# Patient Record
Sex: MALE | Race: WHITE
[De-identification: names, ages, dates, MRNs, and addresses within clinical notes are randomized per-mention and may not be internally consistent; named-entity substitution may affect disease eponyms.]

---

## 2018-01-06 ENCOUNTER — HOSPITAL ENCOUNTER (OUTPATIENT)
Dept: HOSPITAL 80 - FED | Age: 58
Setting detail: OBSERVATION
LOS: 1 days | Discharge: HOME | End: 2018-01-07
Attending: INTERNAL MEDICINE | Admitting: INTERNAL MEDICINE
Payer: COMMERCIAL

## 2018-01-06 DIAGNOSIS — Z96.652: ICD-10-CM

## 2018-01-06 DIAGNOSIS — E10.9: ICD-10-CM

## 2018-01-06 DIAGNOSIS — Z88.8: ICD-10-CM

## 2018-01-06 DIAGNOSIS — Z72.89: ICD-10-CM

## 2018-01-06 DIAGNOSIS — S01.511A: ICD-10-CM

## 2018-01-06 DIAGNOSIS — S01.81XA: ICD-10-CM
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
## 2018-01-06 DIAGNOSIS — S40.811A: ICD-10-CM

## 2018-01-06 DIAGNOSIS — Q28.2: ICD-10-CM

## 2018-01-06 DIAGNOSIS — F12.90: ICD-10-CM

## 2018-01-06 DIAGNOSIS — R55: Primary | ICD-10-CM

## 2018-01-06 LAB
INR PPP: 1.03 (ref 0.83–1.16)
PLATELET # BLD: 291 10^3/UL (ref 150–400)
PROTHROMBIN TIME: 13.7 SEC (ref 12–15)

## 2018-01-06 PROCEDURE — 12015 RPR F/E/E/N/L/M 7.6-12.5 CM: CPT

## 2018-01-06 PROCEDURE — 0HQ1XZZ REPAIR FACE SKIN, EXTERNAL APPROACH: ICD-10-PCS | Performed by: PHYSICIAN ASSISTANT

## 2018-01-06 PROCEDURE — G0480 DRUG TEST DEF 1-7 CLASSES: HCPCS

## 2018-01-06 PROCEDURE — 93880 EXTRACRANIAL BILAT STUDY: CPT

## 2018-01-06 PROCEDURE — 72125 CT NECK SPINE W/O DYE: CPT

## 2018-01-06 PROCEDURE — 93306 TTE W/DOPPLER COMPLETE: CPT

## 2018-01-06 PROCEDURE — 70450 CT HEAD/BRAIN W/O DYE: CPT

## 2018-01-06 PROCEDURE — A9585 GADOBUTROL INJECTION: HCPCS

## 2018-01-06 PROCEDURE — G0378 HOSPITAL OBSERVATION PER HR: HCPCS

## 2018-01-06 PROCEDURE — 93005 ELECTROCARDIOGRAM TRACING: CPT

## 2018-01-06 PROCEDURE — 0CQ1XZZ REPAIR LOWER LIP, EXTERNAL APPROACH: ICD-10-PCS | Performed by: PHYSICIAN ASSISTANT

## 2018-01-06 PROCEDURE — 70553 MRI BRAIN STEM W/O & W/DYE: CPT

## 2018-01-06 NOTE — ECHO
[email or web address]





[address] [address] 

Main: [phone] 



Fax: 



Transthoracic Echocardiogram 

Name:             KARUNA DE LA CRUZ                             MR#:

[id number]

Study Date:       2018                              Study Time:

01:45 PM

YOB: 1960                              Age:

57 year(s)

Height:           182.9 cm (72 in.)                       Weight:

85.73 kg (189 lb.)

BSA:              2.08 m2                                 Gender:

Male

Examination:      Echo                                    Indication:

Cardiac: syncope

Image Quality:                                            Contrast: 

Requested by:     Shahzad Bonilla                            BP:

120 mmHg/59 mmHg

Heart Rate:                                               Rhythm: 

Indication:       Cardiac: syncope 



Procedure Staff 

Ultrasound Technician:   Noemy Arango 

Reading Physician:       Merritt Gerber 

Requesting Provider: 



Conclusions:          1)Normal LV size and systolic function with a

LVEF of 65% and normal wall motions.

2)Mild left atrial enlargement. 

3)Mild MR without MV prolapse. 

4)Trivial TR noted. Unable to accurately assess PA pressures secondary

to incomplete TR jet

velocity envelope. 

5)Mildly enlarged ascending thoracic aorta (4.0cm). 



Measurements: 

Chambers                    Valvular Assessment AV/MV

Valvular Assessment TV/PV



Normal                                   Normal

Normal

Name         Value     Range              Name         Value Range

Name          Value Range

Ao Brenda (MM): 4.0 cm    (2.2 cm-3.7            AV meanP mmHg ( -

)          TR Vmax:      2.21 mm/s ( - )



cm)                MV E Vmax:   0.74 m/s ( - )        TR PGmax:     20

mmHg ( - )

IVSd (2D):   0.8 cm (0.6 cm-1.1               MV A Vmax:   0.52 m/s (

- )        syst. PAP: 25 mmHg  ( - )



cm)                MV E/A:      1.42 ( - )  

LVDd (2D):   4.9 cm    (4.2 cm-5.9 



cm)   

LVDs (2D):   2.3 cm    (2.1 cm-4 



cm)   

LVPWd (2D):  0.8 cm    (0.6 cm-1 



cm)   

LVEF (MOD4): 65 %      (>=55 %)   

EF Range:    65 % 



Continued Measurements: 

Chambers                    Valvular Assessment AV/MV

Valvular Assessment TV/PV



Name                     Value            Name

Value      Name                    Value

LADs:                  4.1 cm                 MV E' Septal:

0.08  m/s   CVP (est.):            5 mmHg

LADs Lon.2 cm                 MV E/E' Septal:

9.90



Patient: KARUNA DE LA CRUZ                          MRN: [id number]

Study Date: 2018   Page 1 of 2

01:45 PM 









LA Area: 21.8 cm2   MV E/E' Lateral: 5.90  



Findings:             Left Ventricle: 

Normal size left ventricle. No LV hypertrophy. Normal global systolic

LV function. The ejection

fraction is estimated to be 65 %. No regional wall motion abnormality.

Normal diastolic LV function.

Right Ventricle: 

Normal size right ventricle.  

Left Atrium: 

The left atrium is mildly dilated.  

Right Atrium: 

The right atrium is normal in size.  

Mitral Valve: 

The mitral valve is normal in appearance and function. Mild mitral

valve regurgitation is present.

Aortic Valve: 

The aortic valve is normal in appearance and function.  

Tricuspid Valve: 

The tricuspid valve is normal in appearance and function. Trivial

tricuspid valve regurgitation.

Pulmonic Valve: 

The pulmonic valve is normal in appearance and function.  

Aorta: 

Dilatation of the aorta.  

Pericardium: 

No pericardial effusion. 







Electronically signed by Merritt Gerber on 2018 at 03:28 PM 

(No Signature Object) 



Patient: KARUNA DE LA CRUZ                          MRN: [id number]

Study Date: 2018   Page 2 of 2

01:45 PM 







[id number]

## 2018-01-06 NOTE — EDPHY
H & P


Smoking Status: Never smoked


Time Seen by Provider: 01/06/18 06:39


HPI/ROS: 





CHIEF COMPLAINT:  Fall with altered mental status





HISTORY OF PRESENT ILLNESS:  Patient has diabetes, which plate poker last night 

with a guys, had some alcohol and possibly an edible.  Wife heard a thud in the 

bathroom at 4:30 a.m. and found him confused and incontinent on the bathroom 

floor bleeding from the face.


She gave him a tsp of honey and this did not change his mental status 

appreciably and was brought in by EMS.


She did not specifically see any seizure activity.


Now the patient has pain in his lip but is otherwise without complaints, but he 

is confused still.  Not associated with headache or weakness or numbness in 

extremities.  Symptoms moderate to severe.





REVIEW OF SYSTEMS:


Eye: no change in vision or double vision


ENT: no sore throat or jaw malocclusion or dental injury


Cardiac:  No chest pain


Pulmonary: no cough or SOB


Abdomen:  No intestinal symptoms


Musculoskeletal: no back pain or neck pain


Skin:  Facial lacerations


Neuro: no headache


Constitutional: no fever or recent illness


:  No symptoms





A comprehensive 10 point review of systems is otherwise negative aside from 

elements mentioned in the history of present illness.





PAST MEDICAL HISTORY:  Discharge summary dated 8/2/2015 personally reviewed, 

was 1st diagnosed with diabetes then.





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 

Played poker last night, some alcohol, possible marijuana edible but no 

recollection.





General Appearance: Alert and conversant, cooperative.


Eyes: No scleral  icterus.  Extraocular motion intact, pupils equal and reactive


ENT, Mouth: Normal mucous membranes.  No hemotympanum, no facial bony or jaw 

tenderness. No trismus.


Respiratory: Normal respiratory effort, breath sounds equal, lungs are clear to 

auscultation.


Cardiovascular:  Regular rate and rhythm.


Gastrointestinal:  Abdomen is soft and non tender.


Neurological:  Patient is alert, face symmetric, strength and sensation normal 

in extremities.  Speech is fluent but he is still confused.  Does not remember 

what happened and when I first asked him his age, he said "51..."  And was 

unable to complete.  He does know it is 2018, not oriented to place, repetitive 

questioning.


Skin:  Facial laceration to the lower lip and 2 lacerations on the chin


Musculoskeletal:  No extremity or clavicular or thoracic or cervical or lumbar 

spine tenderness.


Psychiatric: Not agitated.





Emergency Department course/MDM:





Differential includes but not limited to syncope, seizure, a concussion, 

intracranial hemorrhage.


EKG, labs to include chemistry and glucose, head and cervical spine CT.  

Cervical spine ordered because of altered mental status, inability to clear 

clinically without imaging.  Head CT performed for head trauma with confusion 

and altered mental status.





730:  Noncontrast CT head personally reviewed with Chi.  


Right side calcification vs. blood, unclear if hemorrhage. Negative cervical 

spine.


735:  Results discussed CT reviewed with patient and family, discussed with Dr. Torres from Neurosurgery will review CT as well.


741:  Likely cavernous malformation per Dr. Torres, thinks unlikely to be blood; 

admit medicine to floor, MRI.  Cervical spine clinically cleared at this time 

by me.


815:  repeat glucose 152.


Facial laceration anesthetized by myself with 0.5% bupivacaine local 

anesthesia.  See Vivi Coreas PA-C for repair details.


 (Ghassan Booth)


Constitutional: 





 Initial Vital Signs











Temperature (C)  36.3 C   01/06/18 06:30


 


Heart Rate  69   01/06/18 06:30


 


Respiratory Rate  16   01/06/18 06:30


 


Blood Pressure  158/75 H  01/06/18 06:30


 


O2 Sat (%)  96   01/06/18 06:30








 











O2 Delivery Mode               Room Air














Allergies/Adverse Reactions: 


 





Corticosteroids (Glucocorticoids) Allergy (Verified 01/06/18 06:31)


 


meloxicam Allergy (Verified 01/06/18 06:31)


 








Home Medications: 














 Medication  Instructions  Recorded


 


Insulin Pump, Patient Own 1 ea [facility] [place]/06/18














Medical Decision Making


Consult/Admit Bed Type: [place] [id number]





- Diagnostics


EKG Interpretation: 





12-lead EKG interpreted by me; official reading is in trace master.  My 

interpretation is sinus rhythm rate 65 with  (Ghassan Booth)


Procedures: 


I was asked by Dr. Ghassan Booth to repair facial lacerations.





Laceration repair #1.





Verbal consent was obtained from the patient.  The 2 cm laceration on the chin 

was anesthetized using 1% lidocaine with epinephrine.  The wound was irrigated 

with saline, draped and explored to its base with a gloved finger.  There were 

no deep structures involved.  The wound was repaired with 6 0 Prolene, 5 

sutures.  The wound repair was simple.  The procedure was performed by myself.





Laceration repair #2.





Verbal consent was obtained from the patient.  The 3 cm laceration on the chin 

was anesthetized using 1% lidocaine with epinephrine.  The wound was irrigated 

with saline, draped and explored to its base with a gloved finger.  There were 

no deep structures involved.  The wound was repaired with 6 0 Prolene, 8 

sutures.  The wound repair was simple.  The procedure was performed by myself.





Laceration repair #3.





Verbal consent was obtained from the patient.  The 3 cm irregular laceration on 

the lower lip laceration crossing vermilion border was anesthetized using 1% 

lidocaine with epinephrine.  The wound was irrigated with saline, draped and 

explored to its base with a gloved finger.  There were no deep structures 

involved.  The wound was repaired with 6 0 Prolene, 10 sutures.  The wound 

repair was complex.  The procedure was performed by myself. (Vivi Coreas)


Differential Diagnosis: 





Differential diagnosis considered for head injury including but not limited to 

concussion, skull fracture, intraparenchymal contusion, subarachnoid, subdural 

and epidural hematoma. (Ghassan Booth)





- Data Points


Laboratory Results: 





 Laboratory Results





 01/06/18 06:20 





 01/06/18 06:20 





 











  01/06/18 01/06/18 01/06/18





  07:45 06:20 06:20


 


WBC      5.34 10^3/uL 10^3/uL





     (3.80-9.50) 


 


RBC      5.05 10^6/uL 10^6/uL





     (4.40-6.38) 


 


Hgb      16.4 g/dL g/dL





     (13.7-17.5) 


 


Hct      45.0 % %





     (40.0-51.0) 


 


MCV      89.1 fL fL





     (81.5-99.8) 


 


MCH      32.5 pg pg





     (27.9-34.1) 


 


MCHC      36.4 g/dL g/dL





     (32.4-36.7) 


 


RDW      12.7 % %





     (11.5-15.2) 


 


Plt Count      291 10^3/uL 10^3/uL





     (150-400) 


 


MPV      9.3 fL fL





     (8.7-11.7) 


 


Neut % (Auto)      58.7 % %





     (39.3-74.2) 


 


Lymph % (Auto)      26.4 % %





     (15.0-45.0) 


 


Mono % (Auto)      11.2 % %





     (4.5-13.0) 


 


Eos % (Auto)      2.6 % %





     (0.6-7.6) 


 


Baso % (Auto)      0.7 % %





     (0.3-1.7) 


 


Nucleat RBC Rel Count      0.0 % %





     (0.0-0.2) 


 


Absolute Neuts (auto)      3.13 10^3/uL 10^3/uL





     (1.70-6.50) 


 


Absolute Lymphs (auto)      1.41 10^3/uL 10^3/uL





     (1.00-3.00) 


 


Absolute Monos (auto)      0.60 10^3/uL 10^3/uL





     (0.30-0.80) 


 


Absolute Eos (auto)      0.14 10^3/uL 10^3/uL





     (0.03-0.40) 


 


Absolute Basos (auto)      0.04 10^3/uL 10^3/uL





     (0.02-0.10) 


 


Absolute Nucleated RBC      0.00 10^3/uL 10^3/uL





     (0-0.01) 


 


Immature Gran %      0.4 % %





     (0.0-1.1) 


 


Immature Gran #      0.02 10^3/uL 10^3/uL





     (0.00-0.10) 


 


PT  13.7 SEC SEC    





   (12.0-15.0)   


 


INR  1.03     





   (0.83-1.16)   


 


APTT  21.8 SEC L SEC    





   (23.0-38.0)   


 


Sodium    142 mEq/L mEq/L  





    (134-144)  


 


Potassium    4.5 mEq/L mEq/L  





    (3.5-5.2)  


 


Chloride    99 mEq/L mEq/L  





    ()  


 


Carbon Dioxide    23 mEq/l mEq/l  





    (22-31)  


 


Anion Gap    20 mEq/L H mEq/L  





    (8-16)  


 


BUN    16 mg/dL mg/dL  





    (7-23)  


 


Creatinine    1.2 mg/dL mg/dL  





    (0.7-1.3)  


 


Estimated GFR    > 60   





    


 


Glucose    158 mg/dL H mg/dL  





    ()  


 


Calcium    9.5 mg/dL mg/dL  





    (8.5-10.4)  


 


Troponin I    0.015 ng/mL ng/mL  





    (0.000-0.034)  














Departure





- Departure


Disposition: [facility]


Clinical Impression: 


 Confusion





Facial laceration


Qualifiers:


 Encounter type: initial encounter Qualified Code(s): S01.81XA - Laceration 

without foreign body of other part of head, initial encounter





Lip laceration


Qualifiers:


 Encounter type: initial encounter Qualified Code(s): S01.511A - Laceration 

without foreign body of lip, initial encounter





Condition: Fair

## 2018-01-06 NOTE — CPEKG
Heart Rate: 65

RR Interval: 923

P-R Interval: 196

QRSD Interval: 110

QT Interval: 408

QTC Interval: 425

P Axis: 75

QRS Axis: 64

T Wave Axis: 33

EKG Severity - ABNORMAL ECG -

EKG Impression: SINUS RHYTHM

EKG Impression: NONSPECIFIC INTRAVENTRICULAR CONDUCTION DELAY

Electronically Signed By: Ghassan Booth 06-Jan-2018 06:41:44

## 2018-01-06 NOTE — NEUROPROG
Assessment: 


John_1960





Neurology Consult Note





CC: Dr. Shahzad Smalls consulted neurology for altered mental status. Results 

placed in the EMR for his review.





HPI:


Pt was in normal state of health on 1/5/18. He played poker and had 3-4 beers 

in the evening until 1 am when he went to bed. He may have used edible 

marijuana as well (uses it for sleep). At 4:30 am on 1/6/18 his wife heard him 

fall in the bathroom. He was found down with facial trauma and confused. He has 

an insulin pump for diabetes but his glucose was unknown. EMS was called and he 

was brought to the [facility] ER. He was still confused on arrival but over time the 

confusion has resolved and he is now A&Ox3. He cannot recall events of the 

fall. Head CT showed no acute changes (does have a cavernous hemangioma that 

was seen). I initially saw the patient on 1/6/18. He reported he has had 3 

prior lifetime episodes of awakening in the evening to go to the bathroom and 

then passing out. It is usually worse with alcohol. He reported recalling 

awakening early in the morning of 1/6/18 and getting up to go to the bathroom. 

He feels he likely then passed out in the bathroom and fell. His neurologic 

exam was normal but he did have facial lacerations.





PMHx: DM on insulin pump


PSHx: L knee





SHx: 


FHx: DM, lung cancer





ROS: No acute fever, total vision loss, active severe chest pain, respiratory 

failure, total body severe rash, total bowel/bladder incontinence, psychosis, 

active seizures, or active bleeding





O:


VS reviewed


General: Alert


Eyes: Fundoscopic exam not able to visualize optic disks


CV: Heart RRR, no murmur, no carotid bruit


Lungs: Clear to auscultation bilaterally, no rhonci or rales


Neuro:


- Mental: 


. Oriented x person/place/date


. concentration appears normal


. speech fluency/comprehension normal


. memory appears normal


. fund of knowledge appear intact


- Cranial Nerves:


. II: PERRL, VFFTC


. III/IV/VI: EOMI, no nystagmus, normal smooth pursuits, no Ptosis


. V: facial sensation intact to LT


. VII: face symmetric to eye closure and smile


. VIII: hearing intact to conversation


. IX/X: uvula raises symmetrically


. XI: SCM 5/5 B/L strength


. XII: tongue protrudes midline w/nl strength


- Motor: 


. Tone: normal tone in all 4 extrem


. Strength: no pronator drift, strength 5/5 throughout (B/L delt, bic, tri, 

hand , hf/he, df/pf)


- Reflexes: B/L bic/BR/patella 2/4


- Sensory: all 4 extrem intact to light touch


- Coord: finger-to-nose wnl, KATY wnl, heel-to-shin wnl


- Gait: deferred


- NIH SS 0





Labs:


1/6/18- Glucose not noted to be low





Rads:


1/6/18- Head CT: 12 mm hemorrhage or partially calcified lesion periventricular 

right parietal/occipital region (pt has previously noted cavernous hemangioma 

so this is not likely acute), I personally visualized the study on 1/6/18 1/6/18- Brain MRI: no acute changes, right cavernous hemangioma noted but no 

evidence of bleeding or any other acute changes


Assessment:


1. Vasovagal syncope provoked by alcohol use and urination after awakening from 

sleep: Normal neurologic exam 1/6/18 and unremarkable brain MRI 1/6/18 (did 

show old stable cavernoma). He has a history of 3 previous similar episodes of 

passing out when going to the bathroom at night (years ago) so this is likely 

the correct diagnosis. Treatment is symptomatic. I will obtain telemetry, TTE, 

and carotis U/S to exclude alternative causes of syncope.


2. DM with insulin pump


3. 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 

incidental finding





Plan: 


- TTE


- Carotid U/S


- Telemetry


- If all studies unremarkable and no problems patient can discharge from 

neurology perspective, treatment for vasovagal syncope is supportive


- Symptoms only occur after awakening at night to go to the bathroom so no 

driving restrictions needed


- F/U in neurology clinic 1-4 weeks after discharge





Objective: 





 Vital Signs











Temp Pulse Resp BP Pulse Ox


 


 37.1 C   72   15   120/59 L  97 


 


 01/06/18 10:09  01/06/18 10:09  01/06/18 10:09  01/06/18 10:09 01/06/18 10:09








 











PT  13.7 SEC (12.0-15.0)   01/06/18  07:45    


 


INR  1.03  (0.83-1.16)   01/06/18  07:45    











Allergies/Adverse Reactions: 


 





Corticosteroids (Glucocorticoids) Allergy (Verified 01/06/18 06:31)


 


meloxicam Allergy (Verified 01/06/18 06:31)

## 2018-01-06 NOTE — GCON
[f 
rep st]



                                                                    CONSULTATION





DATE OF CONSULTATION:  01/06/2017



HPI:  The patient is a 56-year-old male who presented to the emergency 
department following a fall, unwitnessed.  The patient apparently awoke this 
morning and was walking down the hallway to the bathroom, when he fell at 
approximately 4:30am.  He does not remember the events of the fall.  His wife 
heard a thud and came out of the bedroom, and found the patient face down in a 
pool of blood.  He was incoherent at 1st upon wakening, and now confusion is 
improving.  He denies a headache, nausea, vomiting.  No upper or lower 
extremity numbness, tingling, weakness, or pain.  He has an abrasion on his 
right upper arm, and swollen lip.  Last evening, he was playing poker and 
drinking alcohol, but does not believe that he drank a lot.  He did have 1 
edible when arriving home yesterday after work.



PAST MEDICAL HISTORY:  Diabetes.



PAST SURGICAL HISTORY:  Multiple left knee surgeries including a total left 
knee replacement 6 months ago, ankle surgery, tonsillectomy.



SOCIAL HISTORY:  Patient admits to alcohol use.  He is .



FAMILY HISTORY:  The patient denies any pertinent neurosurgical family history.



REVIEW OF SYSTEMS:  Patient denies chest pain, shortness of breath, abdominal 
pain, nausea, vomiting, fevers, or chills.  No loss of bowel or bladder control.



ALLERGIES TO MEDICATION:  Corticosteroids, meloxicam.



HOME MEDICATION:  Insulin pump.



PHYSICAL EXAM:  GENERAL:  Patient was seen and examined in the emergency room 
department.  Appears to be in no apparent distress.  Alert and oriented.  Mood 
and affect are appropriate.  VITAL SIGNS:  Blood pressure 158/75, heart rate is 
69, respirations 16, breathing 96% on room air.  Temperature 36.3.  HEENT:  
Extraocular movements are intact.  Pupils are equal and reactive.  Facial 
expression is symmetrical.  Tongue is midline with protrusion.  Hearing is 
grossly intact.  Speech is fluent without dysarthria.  EXTREMITIES:  Muscle 
strength is well preserved in his upper and lower extremities at 5/5.  
Sensation is intact to light touch.  Negative pronator drift.  Finger-to-nose 
testing completed without ataxia.



RESULTS:  White count 5.34, hemoglobin 16.4, hematocrit 45.0, platelet count 291
, PT 13.7, INR 1.03, PTT 21.8.  Sodium 142, potassium 4.5, chloride 99, bicarb 
23, BUN 16, creatinine 1.2.  Head CT completed and demonstrates likely a 
cavernous malformation in the right temporal region.  CT of the cervical spine, 
no fractures.



ASSESSMENT AND PLAN:  In summary, the patient is a 56-year-old male status post 
fall, who does not recall the events of the injury.  Currently, he is without 
headache, nausea, vomiting, or upper or lower extremity symptoms.  CT of the 
cervical spine was negative for any acute fracture.  He has a head CT completed 
showing likely a cavernous malformation in the right temporal region.  The 
patient has been told over 15 years ago that he had a venous malformation and 
had imaging completed at that time, but none recently.  We will proceed with an 
MRI of the brain to confirm.  The patient was seen and examined in the 
emergency room department.  Treatment plan was discussed with Dr. Torres.



ADDENDUM 1/7/17 0700: MRI brain reviewed. Findings consistent with a cavernous 
malformation.  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  
Patient has no acute intracranial hemorrhage.  We will sign off and follow 
peripherally.  Recommend follow up with Dr. Torres in 3-4 weeks as an 
outpatient. 





Job #:  [id number]

MTDD

## 2018-01-06 NOTE — GHP
[f 
rep st]



                                                            HISTORY AND PHYSICAL





DATE OF ADMISSION:  01/06/2018



CHIEF COMPLAINT:  Loss of consciousness.



HISTORY OF PRESENT ILLNESS:  This is a 57-year-old male, with a diagnosis of 
diabetes mellitus since 2015, using an insulin pump, who was found down on the 
bathroom floor by his wife for approximately 3 hours PTA.  Gentleman had played 
poker the evening before standing up to approximately midnight or 1:00 a.m., 
and probably consumed 3-4 beers.  He may have had an edible of marijuana, which 
he uses for sleep.  At 4:30 approximately in the morning his wife heard a thud 
and found him down on the floor, face down in a pool of blood, poorly 
responsive but responsive.  He was not oriented at that time, and appeared to 
suffer facial trauma and she managed to get him back into bed where because he 
was a diabetic and his glucose at that time was unknown she gave him some honey 
and water.  His mental status slowly improved, not dramatically, and she called 
EMS and he was transported to the ER.  In the ER he continued to be confused, 
may be oriented to the year, not to the place, but his mental status has been 
slowly improving at the time of my evaluation, approximately 4 hours following 
the initial event.  He is now alert, oriented x3 and can recall details but 
does not know what time he went to bed. 



His insulin pump is working well except he his glucometer is not, thus he is 
unable to check his sugar and he has been operating on a basal level.  He did 
have beers but he cannot remember exactly how much he ate the evening before.  
He does use edibles to help him sleep but does not use any other drugs.  
Previously he has never suffered a head trauma or loss of consciousness nor has 
he had a seizure disorder.  Per old records he is known to have a cavernous 
venous malformation, dating approximately 15 years ago, although we do not have 
those images there was an MRI done at this facility.  The CT scan today shows 
probably the cavernous venous malformation and an MRI will be ordered.  The 
patient denies recent head trauma, prior history of a seizure disorder, recent 
fever, cold, cough.  He has never had chest pain or cardiac rhythm disturbance.
  Does not suffer from any pulmonary problems.



PAST MEDICAL HISTORY:  Diabetes mellitus diagnosed in 2015.  Denies asthma, 
renal problems, hypertension, or elevated lipids.



PAST SURGICAL HISTORY:  Left knee total replacement 6 months PTA.  Diabetes 
mellitus diagnosed 2015.  He had laser correction of leg varicosities in 2010 
by Dr. Garce.



FAMILY HISTORY:  Positive for diabetes mellitus, with his father and lung cancer
, although his father did in fact smoke.



ALLERGIES:  He is allergic to steroids, causing intractable hiccups occurring 
every 3 seconds, which have required the treatment with Thorazine to stop.



MEDICTIONS:  Reviewed in EMR and reconciled  



REVIEW OF SYSTEMS:  Denies per recent or prior head trauma with loss of 
consciousness or prior seizure disorder.  HEENT:  There are no recent ear 
problems.  His hearing currently is in good condition.  He does not complain of 
any visual changes.  CARDIAC:  Denies prior cardiac disease, history of 
coronary artery disease or cardiac rhythm disturbance.  No history of rheumatic 
fever.  PULMONARY:  Denies asthma, current shortness of breath, orthopnea, PND 
or recent cough or sore throat.  GI:  No history of reflux, bowel disorder.  
:  Denies dysuria, frequency.  MUSCULOSKELETAL:  Recent left knee replacement 
which is working well without difficulties.  PSYCHIATRIC:  Denies.



PHYSICAL EXAMINATION:  VITAL SIGNS:  Normal except for mild elevated blood 
pressure. GENERAL:  This is an alert gentleman who has suffered obvious facial 
trauma.  He is oriented x3 and per his wife, who is in the room, his mental 
status is slowly improving.  HEENT:  Three lacerations, 1 on the lip, 1 on the 
chin and 1 underneath the chin.  The teeth are in good repair without chips.  
There are no lesions of the tongue buccal mucosa as to signs of possible 
seizure.  TMs are gray bilaterally.  The remainder of the scalp and head are 
atraumatic.  NECK:  Supple.  Cervical spine is nontender.  CHEST WALL: 
Nontender and shows no signs of trauma.  LUNGS:  Clear to P and A.  HEART:  
Singular S1 and physiologically split S2.  No murmur, gallop. ABDOMEN:  Flat, 
normoactive bowel sounds.  No masses, tenderness, organomegaly. EXTREMITIES:  
He has an abrasion on his right upper arm.  No other signs of trauma.  
NEUROLOGIC:  He is oriented now x3 and during my evaluation his mental status 
is improving in speed and accuracy.  Cranial nerves 2-12 are intact to specific 
testing as follows, EOMs are intact, pupils are PERRL, face is symmetric to 
motor and sensory function, speech is normal, tongue is in midline, shoulder 
shrug is good.  Motor function shows 5/5 strength in all major muscle groups.  
Babinski are plantar.  Sensory function is grossly intact per the patient and 
my examination.



LABORATORY DATA:  WBC is normal.  INR is normal.  Chemistry panel was normal 
except for slight elevated glucose of 158. Urinalysis tox screen is pending.



ASSESSMENT:  

1.  Acute loss of consciousness with facial trauma.  The differential would 
include possible seizure disorder, hypoglycemia, intracranial bleed or drug-
induced state.  Per the wife, Deepa, the cavernous venous malformation is an 
old finding and yet an MRI will be ordered to evaluate the findings of the CT 
scan.  I have reviewed the CT scan with Radiology and myself which shows 
findings of a cavernous venous malformation or possible bleeding.  This will be 
evaluated by an MRI in more detail.  CT of the cervical spine is also negative.

2.  Diabetes mellitus, now with mild hyperglycemia.  We will continue the use 
of the gentleman's insulin pump and just simply can follow him.  At this point, 
as he is alert and oriented, will begin him on a clear liquid diet and advance 
as tolerated.  He will be placed on seizure precautions.

3.  Mild hypertension.  At this time this will simply be followed and should 
amina, it is likely just secondary to the trauma.

4.  Note that the gentleman has allergy to steroids.

5.  His code status is full.  His wife is his Power of .

6.  Deep vein thrombosis prophylaxis will be with early ambulation.  He is 
active and has low risk of deep vein thrombosis. 

7.  Billing:  The gentleman be placed in observation status and on seizure 
precautions.  It is possible we can resolve this matter within a 24-36 hour 
period. 

8.  Time:  This admission required 55 minutes.





Job #:  [id number]

MTDD

## 2018-01-07 VITALS
TEMPERATURE: 98.5 F | OXYGEN SATURATION: 93 % | RESPIRATION RATE: 16 BRPM | DIASTOLIC BLOOD PRESSURE: 76 MMHG | SYSTOLIC BLOOD PRESSURE: 115 MMHG | HEART RATE: 63 BPM

## 2018-01-07 NOTE — GDS
[f rep st]



                                                             DISCHARGE SUMMARY





KNOWN ACUTE DIAGNOSES ON THIS ADMISSION:  

1.  Vasovagal syncope secondary to dehydration, alcohol consumption and micturition. 

2.  Confirmation of venous cavernous malformation seen on previous CT scan.  

3.  MRI with and without contrast shows findings consistent with a cavernous malformation with increa
sed attenuation on the CT in the right periventricular region.  There was also a venous malformation 
in the right parietooccipital region as noted. 

4.  Facial lacerations x3, sutured in the emergency department. 

5.  Diabetes mellitus type 1, using an insulin pump.



CONSULTATION:  Neurology.



PROCEDURES:  CT scan and MRI scan as noted.  Also, a procedure of an echocardiogram showing no signif
icant findings.  It was a normal echocardiogram.



HOSPITAL COURSE:  This a 57-year-old male, who presented following a syncopal episode in his own bath
room.  He sustained facial lacerations, which were sutured in the emergency department.  CT scan sugg
ested a possible area of abnormality, and an MRI scan confirmed that he had a cavernous venous malfor
mation which had been noted previously in a CT scan from approximately 2009.  No acute bleeding was n
oted.  The patient was seen by Neurology, and it was felt, due to his history, that he had suffered a
 vasovagal syncope secondary to the consumption of alcohol, dehydration, and micturition.  He did not
 have a seizure, and his neurologic exam slowly returned to normal.  His altered mental status was fe
lt secondary to the syncope and perhaps a mild concussion.  



On the day of discharge, the gentleman was alert, oriented, with a symmetric neurologic exam.  He was
 doing well.



DISCHARGE MEDICATIONS:  Tylenol only for pain.  He is also to use his own insulin pump.



PLAN:  The gentleman will follow up for suture removal in 5-7 days with Yesy Schwarz.  He will also
 follow up for management of his diabetes there.  Should there be further difficulties, he will also 
follow up with Dr. Shahzad Bonilla.  The difficulties he will be watching for are seizures or a repeat s
yncopal episode. 



All questions have been answered.  A rhythm strip noted on the cardiac unit was noted to have variati
ons between bradycardia and sinus rhythm during the gentleman's sleep but no abnormalities of conduct
ion were noted.  He had no abnormal dysrhythmia.



TIME:  The discharge required 55 minutes, greater than 50% to  and coordinate care.





Job #:  [id number]

## 2018-01-07 NOTE — NEUROPROG
Assessment: 


Carotid U/S, TTE, and telemetry all unremarkable for any neurologic cause of 

syncope.  Diagnosis is likely vasovagal syncope provoked by alcohol use, 

tiredness, and increased vagal tone from needing to use the bathroom at night.  

Pt with 3 similar lifetime episodes.  Treatment is supportive.  Pt can f/u in 

neurology clinic 1-4 weeks after discharge.  Pt can discharge from neurology 

perspective.  Neurology will sign off.





TTE did show a few mild findings.  I will defer to hospitalist if any of these 

findings require cardiology consultation but I suspect they likely do not.





Objective: 





 Vital Signs











Temp Pulse Resp BP Pulse Ox


 


 36.9 C   63   16   115/76   93 


 


 01/07/18 08:17  01/07/18 08:17  01/07/18 08:17  01/07/18 08:17  01/07/18 08:17








 Laboratory Results





 01/07/18 03:30 





 











 01/06/18 01/07/18 01/08/18





 05:59 05:59 05:59


 


Intake Total  300 


 


Output Total  450 


 


Balance  -150 








 











PT  13.7 SEC (12.0-15.0)   01/06/18  07:45    


 


INR  1.03  (0.83-1.16)   01/06/18  07:45    











Allergies/Adverse Reactions: 


 





Corticosteroids (Glucocorticoids) Allergy (Verified 01/06/18 06:31)


 


meloxicam Allergy (Verified 01/06/18 06:31)

## 2018-01-07 NOTE — ASMTCMCOM
CM Note

 

CM Note                       

Notes:

Reviewed chart regarding discharge plan, pt's progress. Pt admitted following a syncopal episode 

w/ facial lacerations. Per MD notes, syncope was likely vasovagal; pt to discharge home 

independently today w/ no identified needs. No IM signed, not applicable. Pt to follow up as 

directed. CM avail for any further issues or concerns.





Discharge Plan: Home independently



 

 

Date Signed:  01/07/2018 12:42 PM

Electronically Signed By:Nae Pandya RN

## 2018-01-07 NOTE — ASDISCHSUM
----------------------------------------------

Discharge Information

----------------------------------------------

Plan Status:Home with No Needs                       Medically Cleared to Leave:01/06/2018

Discharge Date:01/07/2018 12:27 PM                   CM D/C Disposition:Home, Routine, Self-Care

ADT D/C Disposition:Home, Routine, Self-Care         Projected Discharge Date:01/07/2018 12:27 PM

Transportation at D/C:Family                         Discharge Delay Reason:

Follow-Up Date:01/07/2018 12:27 PM                   Discharge Slot:2 - 12:01 pm - 18:00 pm

Final Diagnosis:Vasovagal syncope sec to dehydration, ETOH consumption, micturition, venous cavernou
s malformation on CT/MRI, facial lacerations x 3 w/ sutures, DM Type I w/ insulin pump

----------------------------------------------

Placement Information

----------------------------------------------

----------------------------------------------

Patient Contact Information

----------------------------------------------

Contact Name:ESSENCE                          Relationship:Wife

Address:[address]                               Home [phone]

                                                     Work Phone:

City:[place]                                         Alternate Phone:

[facility]/Zip Code:[place] [address]                              Email:

----------------------------------------------

Financial Information

----------------------------------------------

Financial Class:[employer]

Primary Plan Desc:[facility]       Primary Plan Number:[id number]

Secondary Plan Desc:                                 Secondary Plan Number:

 

 

----------------------------------------------

Assessment Information

----------------------------------------------

----------------------------------------------

LACE

----------------------------------------------

LACE

 

Acuity / Level of Care        Answers:  Was the patient admitted              

                                        to hospital via the                   

                                        emergency                             

                                        department? Yes:                      

Comorbidities - select        Answers:  Diabetes without                      

all that apply                          complications                         

Emergency dept visits in      Answers:  1                                     

last 6 months                                                                 

Score: 5

 

Date Signed:  01/06/2018 10:01 AM

Electronically Signed By:Saadia Rockwell RN

 

 

----------------------------------------------

[facility] Progress Note

----------------------------------------------

CM Note

 

CM Note                       

Notes:

Reviewed chart regarding discharge plan, pt's progress. Pt admitted following a syncopal episode 

w/ facial lacerations. Per MD notes, syncope was likely vasovagal; pt to discharge home 

independently today w/ no identified needs. No IM signed, not applicable. Pt to follow up as 

directed. CM avail for any further issues or concerns.





Discharge Plan: Home independently



 

 

Date Signed:  01/07/2018 12:42 PM

Electronically Signed By:Nae Pandya RN

 

 

----------------------------------------------

LACE

----------------------------------------------

LACJC

 

Length of stay for            Answers:  Less than 1 day                       

current admission                                                             

Acuity / Level of Care        Answers:  Was the patient admitted              

                                        to hospital via the                   

                                        emergency                             

                                        department? Yes:                      

Comorbidities - select        Answers:  Diabetes without                      

all that apply                          complications                         

Emergency dept visits in      Answers:  1                                     

last 6 months                                                                 

Score: 5

 

Date Signed:  01/07/2018 12:44 PM

Electronically Signed By:Nae Pandya RN

 

 

----------------------------------------------

Intervention Information

----------------------------------------------

## 2018-11-30 ENCOUNTER — HOSPITAL ENCOUNTER (OUTPATIENT)
Dept: HOSPITAL 80 - BMCIMAGING | Age: 58
End: 2018-11-30
Attending: PHYSICIAN ASSISTANT
Payer: COMMERCIAL

## 2018-11-30 DIAGNOSIS — N50.3: Primary | ICD-10-CM
